# Patient Record
Sex: FEMALE | URBAN - NONMETROPOLITAN AREA
[De-identification: names, ages, dates, MRNs, and addresses within clinical notes are randomized per-mention and may not be internally consistent; named-entity substitution may affect disease eponyms.]

---

## 2021-01-21 ENCOUNTER — APPOINTMENT (RX ONLY)
Dept: URBAN - NONMETROPOLITAN AREA CLINIC 16 | Facility: CLINIC | Age: 41
Setting detail: DERMATOLOGY
End: 2021-01-21

## 2021-01-21 VITALS — HEIGHT: 71 IN | WEIGHT: 293 LBS

## 2021-01-21 DIAGNOSIS — D18.0 HEMANGIOMA: ICD-10-CM

## 2021-01-21 DIAGNOSIS — D22 MELANOCYTIC NEVI: ICD-10-CM

## 2021-01-21 DIAGNOSIS — L40.0 PSORIASIS VULGARIS: ICD-10-CM | Status: INADEQUATELY CONTROLLED

## 2021-01-21 DIAGNOSIS — L82.1 OTHER SEBORRHEIC KERATOSIS: ICD-10-CM

## 2021-01-21 PROBLEM — D18.01 HEMANGIOMA OF SKIN AND SUBCUTANEOUS TISSUE: Status: ACTIVE | Noted: 2021-01-21

## 2021-01-21 PROBLEM — D22.5 MELANOCYTIC NEVI OF TRUNK: Status: ACTIVE | Noted: 2021-01-21

## 2021-01-21 PROCEDURE — ? TREATMENT REGIMEN

## 2021-01-21 PROCEDURE — ? FULL BODY SKIN EXAM

## 2021-01-21 PROCEDURE — ? COUNSELING

## 2021-01-21 PROCEDURE — ? ORDER TESTS

## 2021-01-21 PROCEDURE — ? METHOTREXATE INITIATION

## 2021-01-21 PROCEDURE — 99204 OFFICE O/P NEW MOD 45 MIN: CPT

## 2021-01-21 PROCEDURE — ? PRESCRIPTION

## 2021-01-21 RX ORDER — METHOTREXATE SODIUM 2.5 MG/1
TABLET ORAL QWEEK
Qty: 46 | Refills: 3 | Status: ERX | COMMUNITY
Start: 2021-01-21

## 2021-01-21 RX ORDER — FOLIC ACID 1 MG/1
TABLET ORAL QD
Qty: 30 | Refills: 3 | Status: ERX | COMMUNITY
Start: 2021-01-21

## 2021-01-21 RX ADMIN — METHOTREXATE SODIUM 4: 2.5 TABLET ORAL at 00:00

## 2021-01-21 RX ADMIN — FOLIC ACID 1: 1 TABLET ORAL at 00:00

## 2021-01-21 ASSESSMENT — LOCATION ZONE DERM: LOCATION ZONE: TRUNK

## 2021-01-21 ASSESSMENT — LOCATION SIMPLE DESCRIPTION DERM
LOCATION SIMPLE: LEFT UPPER BACK
LOCATION SIMPLE: ABDOMEN

## 2021-01-21 ASSESSMENT — LOCATION DETAILED DESCRIPTION DERM
LOCATION DETAILED: EPIGASTRIC SKIN
LOCATION DETAILED: LEFT INFERIOR MEDIAL UPPER BACK
LOCATION DETAILED: LEFT MEDIAL UPPER BACK

## 2021-01-21 ASSESSMENT — BSA PSORIASIS: % BODY COVERED IN PSORIASIS: 15

## 2021-01-21 NOTE — PROCEDURE: TREATMENT REGIMEN
Other Instructions: Pt states that she has been diagnosed bx proven psoriasis for 25 plus years. She was well controlled with UVB therapy 3 times per week but since [place] to [place] 2 years ago been inadequately controlled on topicals. \\n\\nBaseline labs wnl and we will begin MTX 7.5mg qweek. Plan: Pt states that she has been diagnosed bx proven psoriasis for 25 plus years. She was well controlled with UVB therapy 3 times per week but since moving to [place] 2 years ago been inadequately controlled on topicals. \\n\\nBaseline labs wnl and we will begin MTX 7.5mg qweek.

## 2021-04-19 ENCOUNTER — APPOINTMENT (RX ONLY)
Dept: URBAN - NONMETROPOLITAN AREA CLINIC 16 | Facility: CLINIC | Age: 41
Setting detail: DERMATOLOGY
End: 2021-04-19

## 2021-04-19 VITALS — WEIGHT: 293 LBS | HEIGHT: 71 IN

## 2021-04-19 DIAGNOSIS — L40.0 PSORIASIS VULGARIS: ICD-10-CM | Status: UNCHANGED

## 2021-04-19 PROCEDURE — 99214 OFFICE O/P EST MOD 30 MIN: CPT

## 2021-04-19 PROCEDURE — ? PRESCRIPTION

## 2021-04-19 PROCEDURE — ? FULL BODY SKIN EXAM - DECLINED

## 2021-04-19 PROCEDURE — ? METHOTREXATE MONITORING

## 2021-04-19 PROCEDURE — ? COUNSELING

## 2021-04-19 PROCEDURE — ? TREATMENT REGIMEN

## 2021-04-19 PROCEDURE — ? LAB REPORTS REVIEWED

## 2021-04-19 RX ORDER — METHOTREXATE SODIUM 2.5 MG/1
TABLET ORAL QWEEK
Qty: 16 | Refills: 3 | Status: ERX

## 2021-04-19 ASSESSMENT — PGA PSORIASIS: PGA PSORIASIS 2020: MODERATE

## 2021-04-19 ASSESSMENT — BSA PSORIASIS: % BODY COVERED IN PSORIASIS: 15

## 2021-04-19 NOTE — PROCEDURE: TREATMENT REGIMEN
Other Instructions: Tx goals not met; recheck PSO in 3 mths\\n\\nPt would prefer not to use injectables due to needle phobia. Pt would be willing to trial Otezla if needed in the future. Plan: Tx goals not met; recheck PSO in 3 mths\\n\\nPt would prefer not to use injectables due to needle phobia. Pt would be willing to trial Otezla if needed in the future.

## 2021-04-19 NOTE — PROCEDURE: COUNSELING
Detail Level: Zone
Patient Specific Counseling (Will Not Stick From Patient To Patient): Pt is unsure if she could give herself an injection

## 2021-04-19 NOTE — PROCEDURE: METHOTREXATE MONITORING
Most Recent Cbc (Optional): 04/16/2021
Length Of Therapy (Optional): 3 mths
Detail Level: Zone
Add High Risk Medication Management Associated Diagnosis?: No
Current Dosage (Optional): 7.5mg/week
Comments: Increase to 10 mg qwk and have labs rechecked 4 wks after increasing dose

## 2021-07-30 ENCOUNTER — APPOINTMENT (RX ONLY)
Dept: URBAN - NONMETROPOLITAN AREA CLINIC 16 | Facility: CLINIC | Age: 41
Setting detail: DERMATOLOGY
End: 2021-07-30

## 2021-07-30 DIAGNOSIS — L40.0 PSORIASIS VULGARIS: ICD-10-CM | Status: IMPROVED

## 2021-07-30 PROCEDURE — ? ORDER TESTS

## 2021-07-30 PROCEDURE — ? METHOTREXATE MONITORING

## 2021-07-30 PROCEDURE — 99214 OFFICE O/P EST MOD 30 MIN: CPT

## 2021-07-30 PROCEDURE — ? TREATMENT REGIMEN

## 2021-07-30 PROCEDURE — ? FULL BODY SKIN EXAM - DECLINED

## 2021-07-30 PROCEDURE — ? PRESCRIPTION

## 2021-07-30 PROCEDURE — ? COUNSELING

## 2021-07-30 RX ORDER — FOLIC ACID 1 MG/1
TABLET ORAL QD
Qty: 90 | Refills: 3 | Status: ERX | COMMUNITY
Start: 2021-07-30

## 2021-07-30 RX ORDER — CLOBETASOL PROPIONATE 0.5 MG/ML
SOLUTION TOPICAL
Qty: 1 | Refills: 3 | Status: ERX | COMMUNITY
Start: 2021-07-30

## 2021-07-30 RX ORDER — METHOTREXATE SODIUM 2.5 MG/1
TABLET ORAL QWEEK
Qty: 40 | Refills: 3 | Status: ERX | COMMUNITY
Start: 2021-07-30

## 2021-07-30 RX ADMIN — CLOBETASOL PROPIONATE 1: 0.5 SOLUTION TOPICAL at 00:00

## 2021-07-30 RX ADMIN — FOLIC ACID 1: 1 TABLET ORAL at 00:00

## 2021-07-30 RX ADMIN — METHOTREXATE SODIUM 4: 2.5 TABLET ORAL at 00:00

## 2021-07-30 ASSESSMENT — PGA PSORIASIS: PGA PSORIASIS 2020: MILD

## 2021-07-30 ASSESSMENT — BSA PSORIASIS: % BODY COVERED IN PSORIASIS: 10

## 2021-07-30 NOTE — PROCEDURE: ORDER TESTS
Bill For Surgical Tray: no
Billing Type: Third-Party Bill
Performing Laboratory: 0
Expected Date Of Service: 07/30/2021

## 2021-07-30 NOTE — PROCEDURE: TREATMENT REGIMEN
Start Regimen: Clobetasol solution for scalp
Action 4: Continue
Detail Level: Zone
Other Instructions: Pt states that she has seen a great deal of improvement since beginning MTX. She would like to continue therapy at this time and f/u in 6 months. \\n\\nShe may call prior to next visit if alternative treatments are indicated.
Continue Regimen: MTX 10mg PO QWK\\nclobetasol cream\\n
Discontinue Regimen: Clobetasol foam for scalp (too expensive)

## 2021-07-30 NOTE — PROCEDURE: METHOTREXATE MONITORING
Most Recent Cbc (Optional): 7/27/2021
Length Of Therapy (Optional): 3 mths
Detail Level: Zone
Add High Risk Medication Management Associated Diagnosis?: No
Current Dosage (Optional): 10mg/week
Comments: Pt is satisfied with treatment, skin is almost clear

## 2022-01-03 ENCOUNTER — APPOINTMENT (RX ONLY)
Dept: URBAN - NONMETROPOLITAN AREA CLINIC 16 | Facility: CLINIC | Age: 42
Setting detail: DERMATOLOGY
End: 2022-01-03

## 2022-01-03 VITALS — WEIGHT: 293 LBS | HEIGHT: 71 IN

## 2022-01-03 DIAGNOSIS — L40.0 PSORIASIS VULGARIS: ICD-10-CM | Status: IMPROVED

## 2022-01-03 PROCEDURE — ? PRESCRIPTION

## 2022-01-03 PROCEDURE — ? TREATMENT REGIMEN

## 2022-01-03 PROCEDURE — ? METHOTREXATE MONITORING

## 2022-01-03 PROCEDURE — ? FULL BODY SKIN EXAM - DECLINED

## 2022-01-03 PROCEDURE — ? ORDER TESTS

## 2022-01-03 PROCEDURE — 99214 OFFICE O/P EST MOD 30 MIN: CPT

## 2022-01-03 PROCEDURE — ? COUNSELING

## 2022-01-03 RX ORDER — CLOBETASOL PROPIONATE 0.5 MG/G
AEROSOL, FOAM TOPICAL
Qty: 100 | Refills: 3 | COMMUNITY
Start: 2022-01-03

## 2022-01-03 RX ADMIN — CLOBETASOL PROPIONATE 1: 0.5 AEROSOL, FOAM TOPICAL at 00:00

## 2022-01-03 ASSESSMENT — LOCATION SIMPLE DESCRIPTION DERM: LOCATION SIMPLE: POSTERIOR SCALP

## 2022-01-03 ASSESSMENT — LOCATION ZONE DERM: LOCATION ZONE: SCALP

## 2022-01-03 ASSESSMENT — BSA PSORIASIS: % BODY COVERED IN PSORIASIS: 10

## 2022-01-03 ASSESSMENT — LOCATION DETAILED DESCRIPTION DERM: LOCATION DETAILED: POSTERIOR MID-PARIETAL SCALP

## 2022-01-03 ASSESSMENT — PGA PSORIASIS: PGA PSORIASIS 2020: MILD

## 2022-01-03 NOTE — PROCEDURE: ORDER TESTS
Billing Type: Third-Party Bill
Expected Date Of Service: 01/03/2022
Performing Laboratory: 0
Bill For Surgical Tray: no

## 2022-01-03 NOTE — PROCEDURE: METHOTREXATE MONITORING
Most Recent Cbc (Optional): 12/30/21
Length Of Therapy (Optional): 9 months
Detail Level: Zone
Add High Risk Medication Management Associated Diagnosis?: No
Current Dosage (Optional): 10mg/week
Comments: Pt is satisfied with treatment, skin is almost clear

## 2022-01-03 NOTE — PROCEDURE: MIPS QUALITY
Quality 226: Preventive Care And Screening: Tobacco Use: Screening And Cessation Intervention: Patient screened for tobacco use and is an ex/non-smoker
Quality 410: Psoriasis Clinical Response To Oral Systemic Or Biologic Mediations: Psoriasis Assessment Tool Documented, Met Specified Benchmark
Quality 130: Documentation Of Current Medications In The Medical Record: Current Medications Documented
Detail Level: Detailed
Quality 431: Preventive Care And Screening: Unhealthy Alcohol Use - Screening: Patient not identified as an unhealthy alcohol user when screened for unhealthy alcohol use using a systematic screening method

## 2022-01-03 NOTE — PROCEDURE: TREATMENT REGIMEN
Start Regimen: Clobetasol foam for flares
Action 4: Continue
Detail Level: Zone
Other Instructions: Pt states that she has been using clobetasol sol roughly 1-2 x a week with minimal improvement. She had seen better control with the foam. We have applied a [employer] coupon to help with cost. \\n\\nShe is happy with the improvement that she has seen while on MTX and would like to f/u in 6 months.
Continue Regimen: MTX 10mg PO QWK\\nclobetasol cream prn \\nclobetasol scalp solution 3 x weekly for maintenance

## 2022-07-06 ENCOUNTER — APPOINTMENT (RX ONLY)
Dept: URBAN - NONMETROPOLITAN AREA CLINIC 16 | Facility: CLINIC | Age: 42
Setting detail: DERMATOLOGY
End: 2022-07-06

## 2022-07-06 VITALS — HEIGHT: 71 IN | WEIGHT: 293 LBS

## 2022-07-06 DIAGNOSIS — L40.0 PSORIASIS VULGARIS: ICD-10-CM | Status: IMPROVED

## 2022-07-06 DIAGNOSIS — L71.8 OTHER ROSACEA: ICD-10-CM | Status: WELL CONTROLLED

## 2022-07-06 PROCEDURE — ? METHOTREXATE MONITORING

## 2022-07-06 PROCEDURE — ? PRESCRIPTION MEDICATION MANAGEMENT

## 2022-07-06 PROCEDURE — 99214 OFFICE O/P EST MOD 30 MIN: CPT

## 2022-07-06 PROCEDURE — ? COUNSELING

## 2022-07-06 PROCEDURE — ? ORDER TESTS

## 2022-07-06 PROCEDURE — ? TREATMENT REGIMEN

## 2022-07-06 ASSESSMENT — LOCATION SIMPLE DESCRIPTION DERM: LOCATION SIMPLE: LEFT CHEEK

## 2022-07-06 ASSESSMENT — LOCATION DETAILED DESCRIPTION DERM: LOCATION DETAILED: LEFT INFERIOR CENTRAL MALAR CHEEK

## 2022-07-06 ASSESSMENT — LOCATION ZONE DERM: LOCATION ZONE: FACE

## 2022-07-06 NOTE — PROCEDURE: METHOTREXATE MONITORING
Current Dosage (Optional): 10mg/week
Detail Level: Zone
Cumulative Amount Of Methotrexate (Optional- Include Units): 6 months
Add High Risk Medication Management Associated Diagnosis?: No

## 2022-07-06 NOTE — PROCEDURE: TREATMENT REGIMEN
Action 3: Continue
Continue Regimen: Mtx 10mg q wkly, Clobetsol foam, ointment prn
Detail Level: Zone

## 2022-07-06 NOTE — PROCEDURE: ORDER TESTS
Expected Date Of Service: 07/06/2022
Billing Type: Third-Party Bill
Performing Laboratory: 0
Bill For Surgical Tray: no

## 2022-07-06 NOTE — PROCEDURE: PRESCRIPTION MEDICATION MANAGEMENT
Plan: Has medication will refill upon request. \\n\\nPt states that her rosacea has been flaring the past week due to sun exposure. She states that she was given metronidazole cream which she has been using with improvement.
Render In Strict Bullet Format?: No
Detail Level: Zone

## 2023-06-26 ENCOUNTER — APPOINTMENT (RX ONLY)
Dept: URBAN - NONMETROPOLITAN AREA CLINIC 16 | Facility: CLINIC | Age: 43
Setting detail: DERMATOLOGY
End: 2023-06-26

## 2023-06-26 VITALS — HEIGHT: 71 IN | WEIGHT: 270 LBS

## 2023-06-26 DIAGNOSIS — I83.9 ASYMPTOMATIC VARICOSE VEINS OF LOWER EXTREMITIES: ICD-10-CM

## 2023-06-26 DIAGNOSIS — Z71.89 OTHER SPECIFIED COUNSELING: ICD-10-CM

## 2023-06-26 DIAGNOSIS — D22 MELANOCYTIC NEVI: ICD-10-CM

## 2023-06-26 DIAGNOSIS — L56.8 OTHER SPECIFIED ACUTE SKIN CHANGES DUE TO ULTRAVIOLET RADIATION: ICD-10-CM

## 2023-06-26 DIAGNOSIS — L40.0 PSORIASIS VULGARIS: ICD-10-CM | Status: INADEQUATELY CONTROLLED

## 2023-06-26 DIAGNOSIS — D18.0 HEMANGIOMA: ICD-10-CM

## 2023-06-26 DIAGNOSIS — L57.8 OTHER SKIN CHANGES DUE TO CHRONIC EXPOSURE TO NONIONIZING RADIATION: ICD-10-CM

## 2023-06-26 PROBLEM — D22.5 MELANOCYTIC NEVI OF TRUNK: Status: ACTIVE | Noted: 2023-06-26

## 2023-06-26 PROBLEM — I83.91 ASYMPTOMATIC VARICOSE VEINS OF RIGHT LOWER EXTREMITY: Status: ACTIVE | Noted: 2023-06-26

## 2023-06-26 PROBLEM — D18.01 HEMANGIOMA OF SKIN AND SUBCUTANEOUS TISSUE: Status: ACTIVE | Noted: 2023-06-26

## 2023-06-26 PROCEDURE — ? FULL BODY SKIN EXAM

## 2023-06-26 PROCEDURE — ? COUNSELING

## 2023-06-26 PROCEDURE — ? PRESCRIPTION

## 2023-06-26 PROCEDURE — ? TREATMENT REGIMEN

## 2023-06-26 PROCEDURE — 99214 OFFICE O/P EST MOD 30 MIN: CPT

## 2023-06-26 PROCEDURE — ? METHOTREXATE MONITORING

## 2023-06-26 RX ORDER — CLOBETASOL PROPIONATE 0.5 MG/G
OINTMENT TOPICAL
Qty: 60 | Refills: 3 | Status: ERX | COMMUNITY
Start: 2023-06-26

## 2023-06-26 RX ORDER — CLOBETASOL PROPIONATE 0.5 MG/G
AEROSOL, FOAM TOPICAL
Qty: 100 | Refills: 3 | Status: ERX

## 2023-06-26 RX ADMIN — CLOBETASOL PROPIONATE: 0.5 OINTMENT TOPICAL at 00:00

## 2023-06-26 ASSESSMENT — LOCATION SIMPLE DESCRIPTION DERM
LOCATION SIMPLE: UPPER BACK
LOCATION SIMPLE: RIGHT UPPER BACK
LOCATION SIMPLE: ABDOMEN
LOCATION SIMPLE: CHEST
LOCATION SIMPLE: LEFT UPPER BACK
LOCATION SIMPLE: RIGHT THIGH

## 2023-06-26 ASSESSMENT — PGA PSORIASIS: PGA PSORIASIS 2020: MODERATE

## 2023-06-26 ASSESSMENT — BSA PSORIASIS: % BODY COVERED IN PSORIASIS: 12

## 2023-06-26 ASSESSMENT — LOCATION ZONE DERM
LOCATION ZONE: TRUNK
LOCATION ZONE: LEG

## 2023-06-26 ASSESSMENT — LOCATION DETAILED DESCRIPTION DERM
LOCATION DETAILED: MIDDLE STERNUM
LOCATION DETAILED: RIGHT INFERIOR MEDIAL UPPER BACK
LOCATION DETAILED: SUPERIOR THORACIC SPINE
LOCATION DETAILED: LEFT LATERAL ABDOMEN
LOCATION DETAILED: LEFT MID-UPPER BACK
LOCATION DETAILED: EPIGASTRIC SKIN
LOCATION DETAILED: RIGHT ANTERIOR DISTAL THIGH

## 2023-06-26 ASSESSMENT — ITCH NUMERIC RATING SCALE: HOW SEVERE IS YOUR ITCHING?: 9

## 2023-06-26 NOTE — PROCEDURE: METHOTREXATE MONITORING
Comments: D/c due to inadequate control
Current Dosage (Optional): 10mg/week
Detail Level: Zone
Cumulative Amount Of Methotrexate (Optional- Include Units): 6 months
Add High Risk Medication Management Associated Diagnosis?: No
Calculate Cumulative Dosage Automatically?: Yes
Dosage 2 (Mg/Week): 0

## 2023-06-26 NOTE — PROCEDURE: TREATMENT REGIMEN
Plan: Pt states that she has been experiencing a flare for the last 2-3 months.\\n\\Carmen this time she states she is open to trialing another medication.\\n\\n[facility] discussed other treatment options:\\n1) Otezla\\n2) injections\\n\\nWe will initiate PA for Otezla. Other Instructions: Pt states that she has been experiencing a flare for the last 2-3 months.\\n\\Carmen this time she states she is open to trialing another medication.\\n\\n[facility] discussed other treatment options:\\n1) Otezla\\n2) injections\\n\\nWe will initiate PA for Otezla.

## 2023-06-26 NOTE — PROCEDURE: MIPS QUALITY
Quality 226: Preventive Care And Screening: Tobacco Use: Screening And Cessation Intervention: Patient screened for tobacco use and is an ex/non-smoker
Detail Level: Detailed
Quality 130: Documentation Of Current Medications In The Medical Record: Current Medications Documented
Quality 431: Preventive Care And Screening: Unhealthy Alcohol Use - Screening: Patient not identified as an unhealthy alcohol user when screened for unhealthy alcohol use using a systematic screening method
Quality 485: Psoriasis - Improvement In Patient-Reported Itch Severity: Itch severity assessment score was not reduced by at least 2 points from the initial (index) score to the follow-up visit score or assessment was not completed during the follow-up encounter

## 2023-10-24 ENCOUNTER — APPOINTMENT (RX ONLY)
Dept: URBAN - NONMETROPOLITAN AREA CLINIC 16 | Facility: CLINIC | Age: 43
Setting detail: DERMATOLOGY
End: 2023-10-24

## 2023-10-24 VITALS — HEIGHT: 71 IN | WEIGHT: 265 LBS

## 2023-10-24 DIAGNOSIS — L40.0 PSORIASIS VULGARIS: ICD-10-CM | Status: WELL CONTROLLED

## 2023-10-24 PROCEDURE — ? TREATMENT REGIMEN

## 2023-10-24 PROCEDURE — 99214 OFFICE O/P EST MOD 30 MIN: CPT

## 2023-10-24 PROCEDURE — ? OTEZLA MONITORING

## 2023-10-24 PROCEDURE — ? FULL BODY SKIN EXAM - DECLINED

## 2023-10-24 ASSESSMENT — BSA PSORIASIS: % BODY COVERED IN PSORIASIS: 5

## 2023-10-24 ASSESSMENT — PGA PSORIASIS: PGA PSORIASIS 2020: ALMOST CLEAR

## 2023-10-24 ASSESSMENT — ITCH NUMERIC RATING SCALE: HOW SEVERE IS YOUR ITCHING?: 3

## 2023-10-24 NOTE — PROCEDURE: TREATMENT REGIMEN
Plan: Pt states she has been on Otezla for 4 months now. Pt states she has noticed significant improvement. Pt has one stubborn area on her back side but states it does not bother her at all. \\n\\nPt still uses the scalp solution maybe 2x weekly as needed and that it helps keep her scalp under control as well. Pt does not need refills at this time.\\n\\nPt is happy with tx and denies having any issues or concerns at this time.\\n\\[facility] reassures pt things are going well, and if pt ever notices any adverse effects for pt to call. Other Instructions: Pt states she has been on Otezla for 4 months now. Pt states she has noticed significant improvement. Pt has one stubborn area on her back side but states it does not bother her at all. \\n\\nPt still uses the scalp solution maybe 2x weekly as needed and that it helps keep her scalp under control as well. Pt does not need refills at this time.\\n\\nPt is happy with tx and denies having any issues or concerns at this time.\\n\\[facility] reassures pt things are going well, and if pt ever notices any adverse effects for pt to call.

## 2023-10-24 NOTE — PROCEDURE: MIPS QUALITY
Quality 485: Psoriasis - Improvement In Patient-Reported Itch Severity: Itch severity assessment score is reduced by 2 or more points from the initial (index) assessment score to the follow-up visit score
Quality 410: Psoriasis Clinical Response To Oral Systemic Or Biologic Mediations: Patient has been on biologic or system therapy for less than 6 months
Quality 226: Preventive Care And Screening: Tobacco Use: Screening And Cessation Intervention: Patient screened for tobacco use and is an ex/non-smoker
Quality 130: Documentation Of Current Medications In The Medical Record: Current Medications Documented
Detail Level: Detailed
Quality 431: Preventive Care And Screening: Unhealthy Alcohol Use - Screening: Patient not identified as an unhealthy alcohol user when screened for unhealthy alcohol use using a systematic screening method

## 2024-02-12 ENCOUNTER — RX ONLY (OUTPATIENT)
Age: 44
Setting detail: RX ONLY
End: 2024-02-12

## 2024-02-12 RX ORDER — APREMILAST 30 MG/1
TABLET, FILM COATED ORAL
Qty: 60 | Refills: 11 | Status: ERX | COMMUNITY
Start: 2024-02-12

## 2024-02-12 RX ORDER — APREMILAST 30 MG/1
TABLET, FILM COATED ORAL
Qty: 60 | Refills: 6 | Status: CANCELLED

## 2024-10-22 ENCOUNTER — APPOINTMENT (RX ONLY)
Dept: URBAN - NONMETROPOLITAN AREA CLINIC 16 | Facility: CLINIC | Age: 44
Setting detail: DERMATOLOGY
End: 2024-10-22

## 2024-10-22 VITALS — WEIGHT: 290 LBS | HEIGHT: 71 IN

## 2024-10-22 DIAGNOSIS — L40.0 PSORIASIS VULGARIS: ICD-10-CM | Status: INADEQUATELY CONTROLLED

## 2024-10-22 PROCEDURE — ? COUNSELING

## 2024-10-22 PROCEDURE — ? FULL BODY SKIN EXAM - DECLINED

## 2024-10-22 PROCEDURE — 99214 OFFICE O/P EST MOD 30 MIN: CPT

## 2024-10-22 PROCEDURE — ? PRESCRIPTION MEDICATION MANAGEMENT

## 2024-10-22 PROCEDURE — ? PRESCRIPTION

## 2024-10-22 PROCEDURE — ? OTEZLA MONITORING

## 2024-10-22 RX ORDER — CLOBETASOL PROPIONATE 0.5 MG/G
AEROSOL, FOAM TOPICAL
Qty: 100 | Refills: 3 | Status: ERX

## 2024-10-22 ASSESSMENT — BSA PSORIASIS: % BODY COVERED IN PSORIASIS: 15

## 2024-10-22 ASSESSMENT — LOCATION SIMPLE DESCRIPTION DERM: LOCATION SIMPLE: RIGHT LOWER BACK

## 2024-10-22 ASSESSMENT — LOCATION DETAILED DESCRIPTION DERM: LOCATION DETAILED: RIGHT INFERIOR LATERAL LOWER BACK

## 2024-10-22 ASSESSMENT — PGA PSORIASIS: PGA PSORIASIS 2020: MILD

## 2024-10-22 ASSESSMENT — LOCATION ZONE DERM: LOCATION ZONE: TRUNK

## 2024-10-22 ASSESSMENT — ITCH NUMERIC RATING SCALE: HOW SEVERE IS YOUR ITCHING?: 6

## 2024-10-22 NOTE — PROCEDURE: PRESCRIPTION MEDICATION MANAGEMENT
Continue Regimen: Clobetasol foam \\nOtezla
Plan: Pt states that she stopped taking Otezla due to not being able to afford the medication once the copay assistance ran out. Pt states that the next option would be for her to pay up front and then she would be reimbursed. Pt states she did not have the money up front. \\n\\[facility] explains that there is a patients assistance debit card and for pt to call to get things set up with that to restart Otezla medication.  If pt can’t get through then to call the office and we would  contact the drug rep that provided us with the patient’s assistant debit card information. \\n\\nIf this issue isn’t resolved then [facility] said we will discuss pt starting mtx again. \\nRefills of Clobetasol foam topical was sent to pts pharmacy.
Render In Strict Bullet Format?: No
Detail Level: Zone